# Patient Record
Sex: MALE | Race: OTHER | Employment: FULL TIME | URBAN - NONMETROPOLITAN AREA
[De-identification: names, ages, dates, MRNs, and addresses within clinical notes are randomized per-mention and may not be internally consistent; named-entity substitution may affect disease eponyms.]

---

## 2022-06-29 ENCOUNTER — HOSPITAL ENCOUNTER (OUTPATIENT)
Dept: GENERAL RADIOLOGY | Age: 22
Discharge: HOME | End: 2022-07-01

## 2022-06-29 ENCOUNTER — HOSPITAL ENCOUNTER (OUTPATIENT)
Age: 22
Discharge: HOME | End: 2022-07-01

## 2022-06-29 DIAGNOSIS — S40.011A CONTUSION OF RIGHT SHOULDER, INITIAL ENCOUNTER: ICD-10-CM

## 2022-06-29 PROCEDURE — 73030 X-RAY EXAM OF SHOULDER: CPT

## 2022-07-13 ENCOUNTER — HOSPITAL ENCOUNTER (OUTPATIENT)
Dept: PHYSICAL THERAPY | Age: 22
Setting detail: THERAPIES SERIES
Discharge: HOME | End: 2022-07-13
Payer: COMMERCIAL

## 2022-07-13 PROCEDURE — 97161 PT EVAL LOW COMPLEX 20 MIN: CPT

## 2022-07-13 PROCEDURE — 97110 THERAPEUTIC EXERCISES: CPT

## 2022-07-13 ASSESSMENT — PAIN DESCRIPTION - LOCATION: LOCATION: SHOULDER;BACK

## 2022-07-13 ASSESSMENT — PAIN DESCRIPTION - ORIENTATION: ORIENTATION: RIGHT;LEFT

## 2022-07-13 NOTE — PROGRESS NOTES
Phone: [address]          Fax: [phone]                      Outpatient Physical Therapy                                                                      Evaluation    Date: 2022  Patient: Vimal Parmar  : 2000  ACCT #: [de-identified]    Referring Provider (secondary): Tarah Elaine. Vikash Hernandez, LISA-CNP    Diagnosis: R shoulder and lumbar spine strain     Treatment Diagnosis: R shoulder pain,  lumbar back pain   Onset Date: 22  PT Insurance Information:   Total # of Visits Approved: 12 Per Physician Order  Total # of Visits to Date: 1     Subjective     Additional Pertinent Hx: Patient primarily speaks Ukrainian, used  service on [address]. Pt presents with R shoulder pain and L sided lumbar pain. R shoulder pain is at 7/10 and back pain is 8/10 pain. Pt was at work lifting and carrying boxes 60-63#, fell and box landed on shoulder and at first Pt could not move it after incident. After a week post fall, Pt could move arm better, but did have some stinging with that R shoulder. Occurred around one week ago in the morning. Pt works as [employer]. Pt did experience back and hip pain on the left side when lifting heavy items. When Pt straightens leg there is a cracking noise. Pt is taking medication for pain management. Currently working light duty at work. Pain Assessment  Pain Level:  (7/10 R shoulder; 8/10 L lower back )  Pain Location: Shoulder,Back  Pain Orientation: Right,Left  Social/Functional History  Occupation: Full time employment  Type of Occupation: [employer]       Objective     Observation/Palpation  Palpation: Tenderness to palpation of lumbar spine paraspinals and joint mobs. Observation: Acute injury with some swelling with the R shoulder and stiffness.    Spine  Special Tests: UEFS score 41/80  Joint Mobility  Spine: Grade 1/2 to lumbar spine with L sided L4 hypomobility   ROM RUE: Grade 1/2 AP and inferior joint mobs due to pain intensity   Strength RUE  Strength RUE: Exception  R Shoulder Flexion: 3+/5  Strength LUE  Strength LUE: Exception  L Shoulder Flexion: 4-/5    AROM:  AROM RUE (degrees)  RUE AROM : Exceptions  R Shoulder Flexion 0-180: WFL    PROM:  PROM RUE (degrees)  RUE PROM: Exceptions  RUE General PROM: Flexion was limited with pain reproduction and abduction was restricted to less than 90 deg with pain reproduction. Cracking noise was present with R shoulder abduction. R Shoulder ABduction 0-180: <90  R Shoulder Int Rotation  0-70: 64 deg   R Shoulder Ext Rotation  0-90: 56 deg    PROM RUE (degrees)  RUE PROM: Exceptions  RUE General PROM: Flexion was limited with pain reproduction and abduction was restricted to less than 90 deg with pain reproduction. Cracking noise was present with R shoulder abduction. R Shoulder ABduction 0-180: <90  R Shoulder Int Rotation  0-70: 64 deg   R Shoulder Ext Rotation  0-90: 56 deg        Assessment  Body Structures, Functions, Activity Limitations Requiring Skilled Therapeutic Intervention: Decreased functional mobility ,Decreased ADL status,Decreased ROM,Decreased tolerance to work activity,Decreased strength,Increased pain  Assessment: Used  #[id number] on IPAD. Pt presents with 7/10 R shoulder pain and 8/10 L lumbar pain. Occurred during lifting 60-63# boxes at work about a week ago. Upon evaluation, Pt had 3+/5 MMT with R shoulder flexion. Decreased ROM with R shoulder flexion and abduction with pain reproduction. AP and inferior R shoulder joint mobs were tight and reproduced pain. L sided L4 hypomobility and tightness to the paraspinals. Completed therex per Doc flow. EDucated patient on and issued handout in [address] for [facility].    Therapy Prognosis: Good    Clinical Presentation:  [x] Stable/Uncomplicated  [] Evolving [] Unstable/Unpredictable  The Following Comorbities will impact the patients progression and Plan of Care:   [] Diabetes    [] Stroke  [] Cardiac Disease/Pacemaker [] Previous Orthopedic Injury/Surgery  [] Seizure Disorder   [] WB Restrictions  [] Obesity    [] Neuropathy          Education: HEP and POC     Goals  (Total # of Visits to Date: 1)   Short Term Goals  Time Frame for Short term goals: 6  Short term goal 1: Pt to increase PROM R shoulder ER to 80 deg to help with occupation. Short term goal 2: Pt to increase PROM R shoulder abduction to >140 deg to allow for more shoulder mobility. Short term goal 3: Patient to be independent with HEP for shld and back    Long Term Goals  Time Frame for Long term goals : 12  Long term goal 1: Pt to increase R shoulder flexion MMT to 4+/5 to help with occupational duties. Long term goal 2: Pt to improve UEFS score to >55/80 to return to ADLs and to heavy lifting in occupation.    Long term goal 3: Decrease low back pain 2/10 at worst x3 days    Patient's Goal:   To decrease pain and get back to work    Timed Code Treatment Minutes: 10 Minutes  Total Treatment Time: 55  Time In: 0800  Time Out: 2000 HECTOR Mills, SPT/Directly Supervised By: Ludy Parson, PT  7/13/2022

## 2022-07-13 NOTE — PLAN OF CARE
[facility] YARA LASSITER    Phone: [phone]   Date: 2022                    Outpatient Physical Therapy Fax: [phone] #: [de-identified]                       Plan of Care  Patient: Cm Hurd  : 2000    Referring Provider (secondary): Demetrio James. Sury Howe, APRN-CNP    Diagnosis: R shoulder and lumbar spine strain   Onset Date: 22  Treatment Diagnosis: R shoulder pain,  lumbar back pain     Assessment  Body Structures, Functions, Activity Limitations Requiring Skilled Therapeutic Intervention: Decreased functional mobility ,Decreased ADL status,Decreased ROM,Decreased tolerance to work activity,Decreased strength,Increased pain  Assessment: Used  #[id number] on IPAD. Pt presents with 7/10 R shoulder pain and 8/10 L lumbar pain. Occurred during lifting 60-63# boxes at work about a week ago. Upon evaluation, Pt had 3+/5 MMT with R shoulder flexion. Decreased ROM with R shoulder flexion and abduction with pain reproduction. AP and inferior R shoulder joint mobs were tight and reproduced pain. L sided L4 hypomobility and tightness to the paraspinals. Completed therex per Doc flow. EDucated patient on and issued handout in [address]  for HEP. Therapy Prognosis: Good    Treatment Plan   Days: 3 times per week Weeks: 4 weeks Total # of Visits Approved: 12  [] HP/CP     [] Electrical Stimulation   [x]  Therapeutic Exercise   []  Gait Training  [] Traction   [] Ultrasound                   []  Manual Therapy: Dry Needling                      []  Work Conditioning   [] Aquatics  [] Back Education            []  Therapeutic Activity [x]  Manual Therapy  [x]  Patient Education/HEP []  Anodyne Therapy     Goals  Short Term Goals  Time Frame for Short term goals: 6  Short term goal 1: Pt to increase PROM R shoulder ER to 80 deg to help with occupation. Short term goal 2: Pt to increase PROM R shoulder abduction to >140 deg to allow for more shoulder mobility.    Short

## 2022-07-18 ENCOUNTER — HOSPITAL ENCOUNTER (OUTPATIENT)
Dept: PHYSICAL THERAPY | Age: 22
Setting detail: THERAPIES SERIES
Discharge: HOME | End: 2022-07-18
Payer: COMMERCIAL

## 2022-07-18 PROCEDURE — 97140 MANUAL THERAPY 1/> REGIONS: CPT

## 2022-07-18 PROCEDURE — 97110 THERAPEUTIC EXERCISES: CPT

## 2022-07-18 NOTE — PROGRESS NOTES
Phone: [address]      Fax: [phone]                            Outpatient Physical Therapy                                                                            Daily Note    Date: 2022  Patient Name: Noy White        MRN: [id number]   ACCT#:  [de-identified]  : 2000  (19 y.o.)    Referring Provider (secondary): Alex Marmolejo. Lum Drilling, APRN-CNP         Diagnosis: R shoulder and lumbar spine strain   Treatment Diagnosis: R shoulder pain,  lumbar back pain     Onset Date: 22  PT Insurance Information: WC  Total # of Visits Approved: 12 Per Physician Order  Total # of Visits to Date: 2  Plan of Care/Certification Expiration Date: 08/10/22    Pre-Treatment Pain:  6/10     Assessment  Assessment:  # [id number] utilized. Pt arrived reporting pain 6/10 with movement and pressure. Progressed with gym exercises today which pt toleranted well.reviewed HEP with pt requiring VC to not pull up his head when completing [address]. Educated pt on how to use a towel behing knee and pull up with the towel to get knee to chest. Pt reports having a good understanding of HEP. Plan to continue with exercises for strength and manual for tissue mobility. Plan       Exercises/Modalities/Manual:  See DocFlow Sheet    Education:           Goals  (Total # of Visits to Date: 2)   Short Term Goals - Time Frame for Short term goals: 6  Short Term Goals  Time Frame for Short term goals: 6  Short term goal 1: Pt to increase PROM R shoulder ER to 80 deg to help with occupation. Short term goal 2: Pt to increase PROM R shoulder abduction to >140 deg to allow for more shoulder mobility.    Short term goal 3: Patient to be independent with HEP for shld and back    Long Term Goals - Time Frame for Long term goals : 12  Long Term Goals  Time Frame for Long term goals : 12  Long term goal 1: Pt to increase R shoulder flexion MMT to 4+/5 to help with occupational

## 2022-07-20 ENCOUNTER — HOSPITAL ENCOUNTER (OUTPATIENT)
Dept: PHYSICAL THERAPY | Age: 22
Setting detail: THERAPIES SERIES
Discharge: HOME | End: 2022-07-20
Payer: COMMERCIAL

## 2022-07-20 PROCEDURE — 97140 MANUAL THERAPY 1/> REGIONS: CPT

## 2022-07-20 PROCEDURE — 97110 THERAPEUTIC EXERCISES: CPT

## 2022-07-20 ASSESSMENT — PAIN SCALES - GENERAL: PAINLEVEL_OUTOF10: 6

## 2022-07-20 NOTE — PROGRESS NOTES
Pain:  7/10 R shoulder and 8/10 low back    Time In: 0950    Time Out : 1030   Timed Code Treatment Minutes: 40 Minutes  Total Treatment Time: 40 Minutes    Ther Ex: [id number]  Manual: [id number]    Tamiko Nelson PTA     Date: 7/20/2022

## 2022-07-22 ENCOUNTER — HOSPITAL ENCOUNTER (OUTPATIENT)
Dept: PHYSICAL THERAPY | Age: 22
Setting detail: THERAPIES SERIES
Discharge: HOME | End: 2022-07-22
Payer: COMMERCIAL

## 2022-07-22 PROCEDURE — 97140 MANUAL THERAPY 1/> REGIONS: CPT

## 2022-07-22 PROCEDURE — 97110 THERAPEUTIC EXERCISES: CPT

## 2022-07-22 NOTE — PROGRESS NOTES
Phone: [address] [facility]      Fax: [phone]                            Outpatient Physical Therapy                                                                            Daily Note    Date: 2022  Patient Name: Feliberto Zamudio        MRN: [id number]   ACCT#:  [de-identified]  : 2000  (19 y.o.)    Referring Provider (secondary): LISA Saez-LARRY         Diagnosis: R shoulder and lumbar spine strain   Treatment Diagnosis: R shoulder pain,  lumbar back pain     Onset Date: 22  PT Insurance Information: WC  Total # of Visits Approved: 12 Per Physician Order  Total # of Visits to Date: 4  Plan of Care/Certification Expiration Date: 08/10/22    Pre-Treatment Pain:  2-3/10 R shoulder and 6/10 low back     Assessment  Assessment:  #[id number] utilized for this morning's session. Patient states R shoulder pain is a 2-3/10 and low back pain is a 6/10. Initially following last session patient states both shoulder and back pain increased, but notes he went home after work, took a shower, and rested and the next morning pain wasn't quite as bad. Continued with shoulder strengthening and stretching ex followed by manual to low back. Post manual patient notes pain decreased to a 4-5/10 and R shoulder pain remains a 2-3/10. Plan  Continue with current plan of care    Exercises/Modalities/Manual:  See DocFlow Sheet    Education:     Goals  (Total # of Visits to Date: 4)   Short Term Goals  Time Frame for Short term goals: 6  Short term goal 1: Pt to increase PROM R shoulder ER to 80 deg to help with occupation. Short term goal 2: Pt to increase PROM R shoulder abduction to >140 deg to allow for more shoulder mobility.    Short term goal 3: Patient to be independent with HEP for shld and back    Long Term Goals  Time Frame for Long term goals : 12  Long term goal 1: Pt to increase R shoulder flexion MMT to 4+/5 to help with occupational duties. Long term goal 2: Pt to improve UEFS score to >55/80 to return to ADLs and to heavy lifting in occupation.    Long term goal 3: Decrease low back pain 2/10 at worst x3 days    Post Treatment Pain:  2-3/10 R shoulder and 4-5/10 low back    Time In: 0952    Time Out : 1032   Timed Code Treatment Minutes: 40 Minutes  Total Treatment Time: 40 Minutes    Ther Ex: [id number]  Manual: [id number]    Dimas Bello, DAYANNA     Date: 7/22/2022

## 2022-07-25 ENCOUNTER — HOSPITAL ENCOUNTER (OUTPATIENT)
Dept: PHYSICAL THERAPY | Age: 22
Setting detail: THERAPIES SERIES
Discharge: HOME | End: 2022-07-25
Payer: COMMERCIAL

## 2022-07-25 PROCEDURE — 97140 MANUAL THERAPY 1/> REGIONS: CPT

## 2022-07-25 PROCEDURE — 97110 THERAPEUTIC EXERCISES: CPT

## 2022-07-25 NOTE — PROGRESS NOTES
occupation.    Long term goal 3: Decrease low back pain 2/10 at worst x3 days    Post Treatment Pain:  5/10 low back and 3/10 R shoulder    Time In: 4555  Time Out: 0847  Therex x14 min  Manual therapy 9 min  Timed Code Treatment Minutes: 23 Minutes  Total Treatment Time: 23 Minutes    JULIET Neumann /Directly Supervised by Yelena Ash, PT     Date: 7/25/2022

## 2022-07-27 ENCOUNTER — HOSPITAL ENCOUNTER (OUTPATIENT)
Dept: PHYSICAL THERAPY | Age: 22
Setting detail: THERAPIES SERIES
Discharge: HOME | End: 2022-07-27
Payer: COMMERCIAL

## 2022-07-27 PROCEDURE — 97110 THERAPEUTIC EXERCISES: CPT

## 2022-07-27 PROCEDURE — 97140 MANUAL THERAPY 1/> REGIONS: CPT

## 2022-07-27 ASSESSMENT — PAIN SCALES - GENERAL: PAINLEVEL_OUTOF10: 3

## 2022-07-27 ASSESSMENT — PAIN DESCRIPTION - LOCATION: LOCATION: BACK

## 2022-07-27 NOTE — PROGRESS NOTES
Phone: [address]           Fax: [phone]                            Outpatient Physical Therapy                                                                            Daily Note    Date: 2022  Patient Name: Erma Gann        MRN: [id number]   ACCT#:  [de-identified]  : 2000  (19 y.o.)    Referring Provider (secondary): Louise Dumont. Jeremy Rivers, APRN-CNP         Diagnosis: R shoulder and lumbar spine strain   Treatment Diagnosis: R shoulder pain,  lumbar back pain    Onset Date: 22  PT Insurance Information: WC  Total # of Visits Approved: 12 Per Physician Order  Total # of Visits to Date: 6  Plan of Care/Certification Expiration Date: 08/10/22    Pre-Treatment Pain:  3/10     Assessment  Assessment:  #[id number] and #[id number]. Pt reported 3/10 back pain. Spine joint mobs and soft tissue mobs of the L lumbar region caused increased pain and gaurding. Pt performed bilateral shoulder abduction AROM to overhead without pain. Pt performed lumbar flexion AROM to distal aspect of leg without pain reproduction. Pt completed therex and manual therapy per Doc flow. Plan  Continue with current plan of care    Exercises/Modalities/Manual:  See DocFlow Sheet    Education: CP on lower L side of back to relieve soreness           Goals  (Total # of Visits to Date: 6)     Short Term Goals  Time Frame for Short term goals: 6  Short term goal 1: Pt to increase PROM R shoulder ER to 80 deg to help with occupation. STG Goal 1 Status[de-identified] Not Met  Short term goal 2: Pt to increase PROM R shoulder abduction to >140 deg to allow for more shoulder mobility. STG Goal 2 Status[de-identified] Met  Short term goal 3: Patient to be independent with HEP for shld and back  STG Goal 3 Status[de-identified] Not Met    Long Term Goals  Time Frame for Long term goals : 15  Long term goal 1: Pt to increase R shoulder flexion MMT to 4+/5 to help with occupational duties.   Long term goal 2: Pt to improve UEFS score to >55/80 to return to ADLs and to heavy lifting in occupation.    Long term goal 3: Decrease low back pain 2/10 at worst x3 days    Post Treatment Pain:  3/10    Time In: 0805  Time Out: 4606  Timed Code Treatment Minutes: 41 Minutes  Total Treatment Time: 41 Minutes    Therex 0805 - 0830  Manual [phone] [id number]    Shana Da Silva, SPT /Directly Supervised by Bruna Prater, PT     Date: 7/27/2022

## 2022-07-29 ENCOUNTER — HOSPITAL ENCOUNTER (OUTPATIENT)
Dept: PHYSICAL THERAPY | Age: 22
Setting detail: THERAPIES SERIES
Discharge: HOME | End: 2022-07-29
Payer: COMMERCIAL

## 2022-07-29 PROCEDURE — 97110 THERAPEUTIC EXERCISES: CPT

## 2022-07-29 PROCEDURE — 97140 MANUAL THERAPY 1/> REGIONS: CPT

## 2022-07-29 ASSESSMENT — PAIN SCALES - GENERAL: PAINLEVEL_OUTOF10: 2

## 2022-07-29 NOTE — PROGRESS NOTES
Phone: [address]      Fax: [phone]                            Outpatient Physical Therapy                                                                            Daily Note    Date: 2022  Patient Name: Neta Gitelman        MRN: [id number]   ACCT#:  [de-identified]  : 2000  (19 y.o.)    Referring Provider (secondary): Thad Soto. Nish Alexandra, APRN-CNP         Diagnosis: R shoulder and lumbar spine strain   Treatment Diagnosis: R shoulder pain,  lumbar back pain    Onset Date: 22  PT Insurance Information: WC  Total # of Visits Approved: 12 Per Physician Order  Total # of Visits to Date: 7  Plan of Care/Certification Expiration Date: 08/10/22    Pre-Treatment Pain:  2/10     Assessment  Assessment:  #[id number] for this morning's session. Patient states R shoulder pain is a 2/10 and denies low back pain. Patient reports following up with Navjot Logan Wednesday and they seem to be pretty happy with how patient is progressing. Patient notes he continues to have intermittent popping, but was told he may have that for awhile. Patient is scheduled to follow up with Amirah Ashton again on  to determine if he is able to return to normal duties. Since beginning therapy patient notes his R shoulder and low back are both 90% better. He notes at times when taking boxes down from overhead shelves his shoulder will hurt (not during activity, but afterwards). Completed ex and manual per flowsheet with good tolerance from patient. Post session patient notes no pain in back and 1-2/10 pain in R shoulder. Will continue to progress as able. Plan  Continue with current plan of care    Exercises/Modalities/Manual:  See DocFlow Sheet    Education:    Goals  (Total # of Visits to Date: 7)   Short Term Goals  Time Frame for Short term goals: 6  Short term goal 1: Pt to increase PROM R shoulder ER to 80 deg to help with occupation.   STG Goal 1 Status[de-identified] Not Met  Short term goal 2: Pt to increase PROM R shoulder abduction to >140 deg to allow for more shoulder mobility. STG Goal 2 Status[de-identified] Met  Short term goal 3: Patient to be independent with HEP for shld and back  STG Goal 3 Status[de-identified] Not Met    Long Term Goals  Time Frame for Long term goals : 15  Long term goal 1: Pt to increase R shoulder flexion MMT to 4+/5 to help with occupational duties. Long term goal 2: Pt to improve UEFS score to >55/80 to return to ADLs and to heavy lifting in occupation.    Long term goal 3: Decrease low back pain 2/10 at worst x3 days    Post Treatment Pain:  1-2/10    Time In: 1035    Time Out : 1125   Timed Code Treatment Minutes: 50 Minutes  Total Treatment Time: 50 Minutes    Ther Ex: [id number]  Manual: [id number]    David Sandoval, DAYANNA     Date: 7/29/2022

## 2022-08-02 ENCOUNTER — HOSPITAL ENCOUNTER (OUTPATIENT)
Dept: PHYSICAL THERAPY | Age: 22
Setting detail: THERAPIES SERIES
Discharge: HOME | End: 2022-08-02
Payer: COMMERCIAL

## 2022-08-02 PROCEDURE — 97110 THERAPEUTIC EXERCISES: CPT

## 2022-08-02 NOTE — PROGRESS NOTES
Phone: [address]      Fax: [phone]                            Outpatient Physical Therapy                                                                            Daily Note    Date: 2022  Patient Name: Darcy Juarez        MRN: [id number]   ACCT#:  [de-identified]  : 2000  (19 y.o.)    Referring Provider (secondary): Keeley Almeida. Benito Kuo, APRN-CNP         Diagnosis: R shoulder and lumbar spine strain   Treatment Diagnosis: R shoulder pain,  lumbar back pain    Onset Date: 22  PT Insurance Information:   Total # of Visits Approved: 12 Per Physician Order  Total # of Visits to Date: 8  Plan of Care/Certification Expiration Date: 08/10/22    Pre-Treatment Pain:  0/10     Assessment  Assessment:  #[id number] for this morning's session. Patient denies R shoulder and low back pain this morning. Progressed shoulder and scapular strengthening ex with good tolerance from patient. Patient states he didn't have any pain over the weekend or during work yesterday. Following session patient denies pain in R shoulder and back. Plan to continue x3 visits and D/C. Patient to follow up with [address] [place] [address] on . Plan  Continue with current plan of care    Exercises/Modalities/Manual:  See DocFlow Sheet    Education:     Goals  (Total # of Visits to Date: 8)   Short Term Goals  Time Frame for Short term goals: 6  Short term goal 1: Pt to increase PROM R shoulder ER to 80 deg to help with occupation. STG Goal 1 Status[de-identified] Not Met  Short term goal 2: Pt to increase PROM R shoulder abduction to >140 deg to allow for more shoulder mobility. STG Goal 2 Status[de-identified] Met  Short term goal 3: Patient to be independent with HEP for shld and back  STG Goal 3 Status[de-identified] Not Met    Long Term Goals  Time Frame for Long term goals : 15  Long term goal 1: Pt to increase R shoulder flexion MMT to 4+/5 to help with occupational duties.   Long term goal 2: Pt to improve UEFS score to >55/80 to return to ADLs and to heavy lifting in occupation.    Long term goal 3: Decrease low back pain 2/10 at worst x3 days    Post Treatment Pain:  0/10    Time In: 1033    Time Out : 1115   Timed Code Treatment Minutes: 42 Minutes  Total Treatment Time: [address], [place]     Date: 8/2/2022

## 2022-08-05 ENCOUNTER — HOSPITAL ENCOUNTER (OUTPATIENT)
Dept: PHYSICAL THERAPY | Age: 22
Setting detail: THERAPIES SERIES
Discharge: HOME | End: 2022-08-05
Payer: COMMERCIAL

## 2022-08-05 PROCEDURE — 97110 THERAPEUTIC EXERCISES: CPT

## 2022-08-05 NOTE — PROGRESS NOTES
Phone: [address]      Fax: [phone]                            Outpatient Physical Therapy                                                                            Daily Note    Date: 2022  Patient Name: Mike Blackburn        MRN: [id number]   ACCT#:  [de-identified]  : 2000  (19 y.o.)    Referring Provider (secondary): Jennifer Campos. Carson Bonilla, APRN-CNP         Diagnosis: R shoulder and lumbar spine strain   Treatment Diagnosis: R shoulder pain,  lumbar back pain    Onset Date: 22  PT Insurance Information: WC  Total # of Visits Approved: 12 Per Physician Order  Total # of Visits to Date: 9  Plan of Care/Certification Expiration Date: 08/10/22    Pre-Treatment Pain:  0/10     Assessment  Assessment: Thai intepreter [id number]. Pt reports he remaina painfree. Performed ex as outlined for strengthening of shoulder /back and flexibility of back . Good kelsi to session with no pain or questions after session. Will cont x 2 more visits. Plan  Continue with current plan of care    Exercises/Modalities/Manual:  See DocFlow Sheet        Goals  (Total # of Visits to Date: 5)   Short Term Goals  Time Frame for Short term goals: 6  Short term goal 1: Pt to increase PROM R shoulder ER to 80 deg to help with occupation. STG Goal 1 Status[de-identified] Not Met  Short term goal 2: Pt to increase PROM R shoulder abduction to >140 deg to allow for more shoulder mobility. STG Goal 2 Status[de-identified] Met  Short term goal 3: Patient to be independent with HEP for shld and back  STG Goal 3 Status[de-identified] Not Met    Long Term Goals  Time Frame for Long term goals : 15  Long term goal 1: Pt to increase R shoulder flexion MMT to 4+/5 to help with occupational duties. Long term goal 2: Pt to improve UEFS score to >55/80 to return to ADLs and to heavy lifting in occupation.    Long term goal 3: Decrease low back pain 2/10 at worst x3 days    Post Treatment Pain:  0/10    Time In: 1030    Time Out : 1109        Timed Code Treatment Minutes: 39 Minutes  Total Treatment Time: 42 Minutes    Héctor Sanford   PTA  Date: 8/5/2022

## 2022-08-10 ENCOUNTER — HOSPITAL ENCOUNTER (OUTPATIENT)
Dept: PHYSICAL THERAPY | Age: 22
Setting detail: THERAPIES SERIES
Discharge: HOME | End: 2022-08-10
Payer: COMMERCIAL

## 2022-08-10 PROCEDURE — 97140 MANUAL THERAPY 1/> REGIONS: CPT

## 2022-08-10 PROCEDURE — 97110 THERAPEUTIC EXERCISES: CPT

## 2022-08-10 ASSESSMENT — PAIN SCALES - GENERAL: PAINLEVEL_OUTOF10: 3

## 2022-08-10 NOTE — PROGRESS NOTES
Phone: [address]      Fax: [phone]                            Outpatient Physical Therapy                                                                            Daily Note    Date: 8/10/2022  Patient Name: Francisca Castillo        MRN: [id number]   ACCT#:  [de-identified]  : 2000  (19 y.o.)    Referring Provider (secondary): Otf Arzate. Jennifer Butler, APRN-CNP         Diagnosis: R shoulder and lumbar spine strain   Treatment Diagnosis: R shoulder pain,  lumbar back pain    Onset Date: 22  PT Insurance Information:   Total # of Visits Approved: 12 Per Physician Order  Total # of Visits to Date: 10  Plan of Care/Certification Expiration Date: 08/10/22    Pre-Treatment Pain:  3/10     Assessment  Assessment: [place] intepreter #[id number] utilized for this julia's session. Patient reports no R shoulder pain, but reports 3/10 L low back pain this morning. He reports doing some heavier work yesterday which increased his pain to a 5-6/10. Patient states it was just painful yesterday and believes it is because he hadn't done that strenuous of work since initial injury. Completed back stretches followed by tissue mobs and long axis distraction. Following session patient states pain decreased to a 2/10. [address] and spoke with Giovani Hardy re: [id number] exp date. She states patient did not show up for follow up with Catrachita Nunez this Monday, but is rescheduled for 8/15 @8. Since patient's pain flared up and today was last day of C9 she was going to put in for additional visits and extend date. Patient is scheduled for 2 more visits next week. Will wait to see how follow up goes with Catrachita Nunez next Monday and determine need for continued PT or D/C.     Plan  Continue with current plan of care    Exercises/Modalities/Manual:  See DocFlow Sheet    Education:     Goals  (Total # of Visits to Date: 10)   Short Term Goals  Time Frame for Short term goals: 6  Short term goal 1: Pt to increase PROM R shoulder ER to 80 deg to help with occupation. STG Goal 1 Status[de-identified] Not Met  Short term goal 2: Pt to increase PROM R shoulder abduction to >140 deg to allow for more shoulder mobility. STG Goal 2 Status[de-identified] Met  Short term goal 3: Patient to be independent with HEP for shld and back  STG Goal 3 Status[de-identified] Not Met    Long Term Goals  Time Frame for Long term goals : 15  Long term goal 1: Pt to increase R shoulder flexion MMT to 4+/5 to help with occupational duties. Long term goal 2: Pt to improve UEFS score to >55/80 to return to ADLs and to heavy lifting in occupation.    Long term goal 3: Decrease low back pain 2/10 at worst x3 days    Post Treatment Pain:  2/10    Time In: 1120    Time Out : 1200   Timed Code Treatment Minutes: 40 Minutes  Total Treatment Time: 40 Minutes    Ther Ex: [id number]  Manual: [id number]    [place] PTA     Date: 8/10/2022

## 2022-08-16 ENCOUNTER — HOSPITAL ENCOUNTER (OUTPATIENT)
Dept: PHYSICAL THERAPY | Age: 22
Setting detail: THERAPIES SERIES
Discharge: HOME | End: 2022-08-16
Payer: COMMERCIAL

## 2022-08-16 NOTE — PROGRESS NOTES
[facility]  Rehab and Wellness    Date: 2022  Patient Name: Maxine Gil        : 2000       Pt Cancelled Appt due to no longer requires services. Su Bliss from [employer] called and states pt would like to cancel today's appt and future appts. States he followed up with doctor and doctor states he no longer needs to attend.       REGINA Hannon Date: 2022

## 2022-08-18 ENCOUNTER — APPOINTMENT (OUTPATIENT)
Dept: PHYSICAL THERAPY | Age: 22
End: 2022-08-18
Payer: COMMERCIAL

## 2022-08-18 NOTE — DISCHARGE SUMMARY
Phone: Jonas Hager Aruna             Fax: [phone]                            Outpatient Physical Therapy                                                                    Discharge Summary    Patient: Bryn Chirinos  : 2000  ACCT #: [de-identified]   Referring Provider (secondary): LASHAWN Willoughby      Diagnosis: R shoulder and lumbar spine strain   Date Treatment Initiated: 22  Date of Last Treatment: 8/10/22    PT Visit Information  Onset Date: 22  PT Insurance Information:   Total # of Visits Approved: 12  Total # of Visits to Date: 10    Frequency/Duration  Days: 3 times per week  Weeks: 4 weeks    Treatment Received  Patient Education/HEP, Therapeutic Exercise, Manual Therapy: Myofacial Release/Cupping, and Manual Therapy: Mobilization/Manipulation    Pain Level:  back pain 3/10; shoulder pain 0/10    Assessment  AROM R shld WFL all planes. Trunk ROM WFL. Strength R shld flexion 4+/5, able to lift overhead. Patient educated on proper lifting and on HEP. Reason for Discharge  Patient Self Discharge- Per phone call on 22. patient is better and no longer needs therapy    Comments:   Thank you for this referral      Jim Pete, PT  Date: 2022